# Patient Record
Sex: MALE | Race: WHITE | NOT HISPANIC OR LATINO
[De-identification: names, ages, dates, MRNs, and addresses within clinical notes are randomized per-mention and may not be internally consistent; named-entity substitution may affect disease eponyms.]

---

## 2023-03-13 ENCOUNTER — NON-APPOINTMENT (OUTPATIENT)
Age: 48
End: 2023-03-13

## 2023-05-25 PROBLEM — Z00.00 ENCOUNTER FOR PREVENTIVE HEALTH EXAMINATION: Status: ACTIVE | Noted: 2023-05-25

## 2023-05-26 ENCOUNTER — RESULT REVIEW (OUTPATIENT)
Age: 48
End: 2023-05-26

## 2023-05-26 ENCOUNTER — OUTPATIENT (OUTPATIENT)
Dept: OUTPATIENT SERVICES | Facility: HOSPITAL | Age: 48
LOS: 1 days | End: 2023-05-26
Payer: COMMERCIAL

## 2023-05-26 ENCOUNTER — APPOINTMENT (OUTPATIENT)
Dept: ORTHOPEDIC SURGERY | Facility: CLINIC | Age: 48
End: 2023-05-26
Payer: COMMERCIAL

## 2023-05-26 VITALS
BODY MASS INDEX: 27.7 KG/M2 | WEIGHT: 187 LBS | SYSTOLIC BLOOD PRESSURE: 127 MMHG | HEIGHT: 69 IN | HEART RATE: 60 BPM | TEMPERATURE: 98.8 F | OXYGEN SATURATION: 99 % | DIASTOLIC BLOOD PRESSURE: 79 MMHG

## 2023-05-26 DIAGNOSIS — Z86.79 PERSONAL HISTORY OF OTHER DISEASES OF THE CIRCULATORY SYSTEM: ICD-10-CM

## 2023-05-26 PROCEDURE — 99203 OFFICE O/P NEW LOW 30 MIN: CPT

## 2023-05-26 PROCEDURE — 73564 X-RAY EXAM KNEE 4 OR MORE: CPT

## 2023-05-26 PROCEDURE — 73564 X-RAY EXAM KNEE 4 OR MORE: CPT | Mod: 26,50

## 2023-05-26 NOTE — PHYSICAL EXAM
[de-identified] : General: Well-nourished, well-developed, alert, and in no acute distress.\par Head: Normocephalic.\par Eyes: Pupils equal, extraocular muscles intact, normal sclera.\par Nose: No nasal discharge.\par Cardiovascular: Extremities are warm and well perfused. Distal pulses are symmetric bilaterally.\par Respiratory: No labored breathing.\par Extremities: Sensation is intact distally bilaterally. Distal pulses are symmetric bilaterally\par Lymphatic: No regional lymphadenopathy, no lymphedema\par Neurologic: No focal deficits\par Skin: Normal skin color, texture, and turgor\par Psychiatric: Normal affect \par \par MSK:\par Examination of right knee:\par \par Gait normal antalgic\par Slight genu varus alignment\par No pain with double leg squat\par Valgus moment with single leg squat b/l \par No effusion\par No erythema, hematoma or skin lesion\par \par Nontender to palpation: medial joint line, lateral joint line, medial patellar facet, lateral patellar facet, quad tendon, patellar tendon, pes, Gerdy's tubercle, tibial tuberosity, popliteal fossa, hamstrings, ITB \par No warmth\par No Baker's cyst palpable\par ROM: 0-120\par No patellar crepitus\par \par Log roll negative\par Lachman negative\par Anterior drawer negative\par Posterior drawer negative\par Varus/valgus stress negative at 0 and 30 deg\par Jenna  negative\par Patellar grind negative\par Noble negative\par Thessaly negative\par \par Examination of left knee:\par \par No effusion, erythema, hematoma or skin lesion\par Nontender to palpation: medial joint line, lateral joint line, medial patellar facet, lateral patellar facet, quad tendon, patellar tendon, pes, Gerdy's tubercle, tibial tuberosity, popliteal fossa, hamstrings, ITB \par No warmth\par No Baker's cyst palpable\par ROM: 0-120\par No patellar crepitus\par \par Log roll negative\par Lachman negative\par Anterior drawer negative\par Posterior drawer negative\par Varus/valgus stress negative at 0 and 30 deg\par Jenna  negative\par Patellar grind negative \par Noble negative\par Thessaly negative\par \par Sensation is intact to light touch over the superficial and deep peroneal nerve distributions and the posterior tibial nerve distribution. Capillary refill is less than two seconds. Posterior tibial and dorsalis pedis pulses 2+ equal bilaterally. No calf swelling or tenderness bilaterally. Strength testing shows  Hip flexion 5/5, Hip abduction 5/5, Hip abduction 5/5, Knee Extension 5/5, Knee Flexion 5/5, dorsiflexion 5/5, plantar flexion 5/5, EHL 5/5\par Reflexes: Patellar 2+, Achilles 2+\par   [de-identified] : XR bilateral knees (5/26/23): There is no evidence of fracture or dislocation. There is mild medial joint space narrowing bilaterally. There are superior patellar enthesophytes. There is mild lateral patellar displacement bilaterally.

## 2023-05-26 NOTE — ASSESSMENT
[FreeTextEntry1] : MICHELLE POPE is a 48 year old male with right knee pain.\par I discussed with the patient that their symptoms, signs, and imaging are most consistent with patellofemoral syndrome and possible meniscus tear.\par We reviewed the natural history of this condition and treatment options ranging from conservative measures (activity modification, physical therapy, icing, oral anti-inflammatory and/or analgesic medications, steroid injection, HA gel injections, PRP injections) to surgical management. \par We agreed on the following plan:\par \par XR taken and reviewed with patient today.\par Activity modification: low impact aerobic activity (stationary bike, elliptical, swimming)\par Recommend 150 min per week of moderate intensity aerobic activity \par Start Home Exercises for knee conditioning. Demonstration, resistance bands and handout provided. \par Physical therapy. Referral provided.\par Medication: Ibuprofen prn\par Kinesio-tape applied.\par Advanced imaging: consider MRI if no symptomatic improvement \par Follow up in 6-8 weeks.

## 2023-05-26 NOTE — HISTORY OF PRESENT ILLNESS
[de-identified] : MICHELLE POPE is a 48 year old male who presents with right knee pain.\par States the onset of pain was several years ago.\par Recent exacerbation 3 weeks ago when performing squats and playing tennis and pickle ball\par No antecedent trauma or injury. Has not experienced previously.\par Pain is nonradiating.\par There is associated swelling, stiffness\par There is no associated swelling, locking, buckling, numbness, paraesthesia or weakness.\par Exacerbating factors are standing, walking for prolonged periods,  rising from seated position.\par Has tried Ibuprofen, ice, rest and compression\par Patient ambulates independently.\par Exercise: regular tennis, pickle ball, swimming\par Has not tried PT\par Employment: [employer]

## 2024-01-10 ENCOUNTER — APPOINTMENT (OUTPATIENT)
Dept: ORTHOPEDIC SURGERY | Facility: CLINIC | Age: 49
End: 2024-01-10
Payer: COMMERCIAL

## 2024-01-10 VITALS
HEART RATE: 117 BPM | HEIGHT: 69 IN | SYSTOLIC BLOOD PRESSURE: 118 MMHG | DIASTOLIC BLOOD PRESSURE: 79 MMHG | BODY MASS INDEX: 27.7 KG/M2 | WEIGHT: 187 LBS | OXYGEN SATURATION: 97 %

## 2024-01-10 DIAGNOSIS — M65.9 SYNOVITIS AND TENOSYNOVITIS, UNSPECIFIED: ICD-10-CM

## 2024-01-10 DIAGNOSIS — M23.306 OTHER MENISCUS DERANGEMENTS, UNSPECIFIED MENISCUS, RIGHT KNEE: ICD-10-CM

## 2024-01-10 DIAGNOSIS — B07.0 PLANTAR WART: ICD-10-CM

## 2024-01-10 DIAGNOSIS — M22.2X1 PATELLOFEMORAL DISORDERS, RIGHT KNEE: ICD-10-CM

## 2024-01-10 PROCEDURE — 99213 OFFICE O/P EST LOW 20 MIN: CPT

## 2024-01-10 NOTE — HISTORY OF PRESENT ILLNESS
[de-identified] : MICHELLE POPE is a 49 year old male presents to follow up with right knee pain. Last visit was 05/26/23 at which time patient was advised to start home exercises for knee conditioning, PT and take ibuprofen prn.  States pain had been improving but had recent exacerbation over the last few weeks when attempted to return to pickle ball. Pain is anterolateral knee, distal 1/3 anterolateral tibial aspect and plantar forefoot.

## 2024-01-10 NOTE — PHYSICAL EXAM
[de-identified] : General: Well-nourished, well-developed, alert, and in no acute distress. Head: Normocephalic. Eyes: Pupils equal, extraocular muscles intact, normal sclera. Nose: No nasal discharge. Cardiovascular: Extremities are warm and well perfused. Distal pulses are symmetric bilaterally. Respiratory: No labored breathing. Extremities: Sensation is intact distally bilaterally. Distal pulses are symmetric bilaterally Lymphatic: No regional lymphadenopathy, no lymphedema Neurologic: No focal deficits Skin: Normal skin color, texture, and turgor Psychiatric: Normal affect   MSK: Examination of [right] knee:   [No] effusion No erythema, hematoma or skin lesion Tender to palpation: Nontender to palpation: medial joint line, lateral joint line, medial patellar facet, lateral patellar facet, quad tendon, patellar tendon, pes, Gerdy's tubercle, tibial tuberosity, popliteal fossa, hamstrings, ITB No warmth No Baker's cyst palpable ROM: 0-120 [No] patellar crepitus   Log roll negative Lachman negative Anterior drawer negative Posterior drawer negative Varus/valgus stress negative at 0 and 30 deg Jenna  negative Patellar grind negative   Examination of [left]  knee:   No effusion, erythema, hematoma or skin lesion Nontender to palpation: medial joint line, lateral joint line, medial patellar facet, lateral patellar facet, quad tendon, patellar tendon, pes, Gerdy's tubercle, tibial tuberosity, popliteal fossa, hamstrings, ITB No warmth No Baker's cyst palpable ROM: 0-120 No patellar crepitus   Log roll negative Lachman negative Anterior drawer negative Posterior drawer negative Varus/valgus stress negative at 0 and 30 deg Jenna negative Patellar grind negative  Examination of right foot: Plantar wart tender to palpation at 4th MTP joint. Preston's click negative.   Sensation is intact to light touch over the superficial and deep peroneal nerve distributions and the posterior tibial nerve distribution. Capillary refill is less than two seconds. Posterior tibial and dorsalis pedis pulses 2+ equal bilaterally. No calf swelling or tenderness bilaterally. Strength testing shows  Hip flexion 5/5, Hip adduction 5/5, Hip abduction 5/5, Knee Extension 5/5, Knee Flexion 5/5, dorsiflexion 5/5, plantar flexion 5/5, EHL 5/5 Reflexes: Patellar 2+, Achilles 2+

## 2024-01-10 NOTE — ASSESSMENT
[FreeTextEntry1] : MICHELLE POPE is a 49 year old male with right knee, shin and foot pain.    I discussed with the patient that their symptoms, signs, and imaging are most consistent with possible meniscus tear, patellofemoral pain, EDL tenosynovitis and plantar wart. We reviewed the natural history of this condition and treatment options. We agreed on the following plan:  Encouraged to continue home exercises per handout. Recommend 150 min per week of moderate intensity aerobic activity. OTC salicylic acid treatment for plantar wart, cushion, advised to pare wart with blade prior to application otherwise f/u Dermatologist or Podiatry.  Imaging: MRI right knee Follow up after imaging.

## 2024-02-07 ENCOUNTER — APPOINTMENT (OUTPATIENT)
Dept: ORTHOPEDIC SURGERY | Facility: CLINIC | Age: 49
End: 2024-02-07
Payer: COMMERCIAL

## 2024-02-07 VITALS
OXYGEN SATURATION: 96 % | HEIGHT: 69 IN | BODY MASS INDEX: 27.7 KG/M2 | HEART RATE: 84 BPM | WEIGHT: 187 LBS | SYSTOLIC BLOOD PRESSURE: 128 MMHG | DIASTOLIC BLOOD PRESSURE: 83 MMHG

## 2024-02-07 DIAGNOSIS — S83.203S OTHER TEAR OF UNSPECIFIED MENISCUS, CURRENT INJURY, RIGHT KNEE, SEQUELA: ICD-10-CM

## 2024-02-07 PROCEDURE — 99213 OFFICE O/P EST LOW 20 MIN: CPT

## 2024-02-07 RX ORDER — HYALURONATE SODIUM, STABILIZED 60 MG/3 ML
60 SYRINGE (ML) INTRAARTICULAR
Qty: 1 | Refills: 0 | Status: ACTIVE | COMMUNITY
Start: 2024-02-07

## 2024-02-11 NOTE — PHYSICAL EXAM
[de-identified] : General: Well-nourished, well-developed, alert, and in no acute distress. Head: Normocephalic. Eyes: Pupils equal, extraocular muscles intact, normal sclera. Nose: No nasal discharge. Cardiovascular: Extremities are warm and well perfused. Distal pulses are symmetric bilaterally. Respiratory: No labored breathing. Extremities: Sensation is intact distally bilaterally. Distal pulses are symmetric bilaterally Lymphatic: No regional lymphadenopathy, no lymphedema Neurologic: No focal deficits Skin: Normal skin color, texture, and turgor Psychiatric: Normal affect  [de-identified] : MRI R knee (1/18/24):  Focal oblique tear of the medial meniscus posterior horn/body junction. Complex/degenerative tearing of the lateral meniscus posterior horn and body including a dominant radial component of the meniscal body.  Mild meniscal extrusion. Early lateral tibiofemoral compartment articular cartilage wear. Trace knee joint effusion.  Tiny popliteal cyst.

## 2024-02-11 NOTE — ASSESSMENT
[FreeTextEntry1] : MICHELLE POPE is a 49 year old male with right knee pain. I discussed with the patient that their symptoms, signs, and imaging are most consistent with meniscus tear. We reviewed the natural history of this condition and treatment options. We agreed on the following plan:  MRI reviewed with patient. Encouraged to continue home exercises per handout. Will place order for HA gel injection. Patient will be notified once authorized to schedule appointment.

## 2024-02-11 NOTE — HISTORY OF PRESENT ILLNESS
[de-identified] : MICHELLE POPE is a 49 year old male presents to follow up with right knee pain Last visit was 01/10/24 at which time patient was referred for MRI and advised to continue home exercises. MRI detailed below. States pain has improved. Has been able to return to playing pickle ball. Wearing brace on both knees which helps.

## 2024-10-30 ENCOUNTER — APPOINTMENT (OUTPATIENT)
Dept: ORTHOPEDIC SURGERY | Facility: CLINIC | Age: 49
End: 2024-10-30

## 2024-10-30 VITALS — DIASTOLIC BLOOD PRESSURE: 78 MMHG | HEART RATE: 65 BPM | SYSTOLIC BLOOD PRESSURE: 112 MMHG | OXYGEN SATURATION: 96 %

## 2024-10-30 DIAGNOSIS — S83.203S OTHER TEAR OF UNSPECIFIED MENISCUS, CURRENT INJURY, RIGHT KNEE, SEQUELA: ICD-10-CM

## 2024-10-30 PROCEDURE — 99214 OFFICE O/P EST MOD 30 MIN: CPT

## 2024-10-30 RX ORDER — HYALURONATE SODIUM, STABILIZED 60 MG/3 ML
60 SYRINGE (ML) INTRAARTICULAR
Qty: 1 | Refills: 0 | Status: ACTIVE | COMMUNITY
Start: 2024-10-30

## 2025-05-06 ENCOUNTER — APPOINTMENT (OUTPATIENT)
Dept: HEART AND VASCULAR | Facility: CLINIC | Age: 50
End: 2025-05-06

## 2025-05-06 ENCOUNTER — NON-APPOINTMENT (OUTPATIENT)
Age: 50
End: 2025-05-06

## 2025-05-06 VITALS
OXYGEN SATURATION: 97 % | WEIGHT: 187 LBS | HEART RATE: 72 BPM | TEMPERATURE: 98.5 F | HEIGHT: 69 IN | SYSTOLIC BLOOD PRESSURE: 120 MMHG | BODY MASS INDEX: 27.7 KG/M2 | DIASTOLIC BLOOD PRESSURE: 70 MMHG

## 2025-05-06 DIAGNOSIS — Z82.49 FAMILY HISTORY OF ISCHEMIC HEART DISEASE AND OTHER DISEASES OF THE CIRCULATORY SYSTEM: ICD-10-CM

## 2025-05-06 DIAGNOSIS — I10 ESSENTIAL (PRIMARY) HYPERTENSION: ICD-10-CM

## 2025-05-06 DIAGNOSIS — Z78.9 OTHER SPECIFIED HEALTH STATUS: ICD-10-CM

## 2025-05-06 PROCEDURE — 93000 ELECTROCARDIOGRAM COMPLETE: CPT

## 2025-05-06 PROCEDURE — 99203 OFFICE O/P NEW LOW 30 MIN: CPT | Mod: 25

## 2025-05-06 RX ORDER — LOSARTAN POTASSIUM 25 MG/1
25 TABLET, FILM COATED ORAL DAILY
Refills: 0 | Status: ACTIVE | COMMUNITY

## 2025-05-30 ENCOUNTER — RX RENEWAL (OUTPATIENT)
Age: 50
End: 2025-05-30

## 2025-08-05 ENCOUNTER — OUTPATIENT (OUTPATIENT)
Dept: OUTPATIENT SERVICES | Facility: HOSPITAL | Age: 50
LOS: 1 days | End: 2025-08-05
Payer: SELF-PAY

## 2025-08-05 ENCOUNTER — APPOINTMENT (OUTPATIENT)
Dept: CT IMAGING | Facility: HOSPITAL | Age: 50
End: 2025-08-05

## 2025-08-05 PROCEDURE — 75571 CT HRT W/O DYE W/CA TEST: CPT | Mod: 26
